# Patient Record
Sex: FEMALE | Race: WHITE | NOT HISPANIC OR LATINO
[De-identification: names, ages, dates, MRNs, and addresses within clinical notes are randomized per-mention and may not be internally consistent; named-entity substitution may affect disease eponyms.]

---

## 2021-01-17 ENCOUNTER — TRANSCRIPTION ENCOUNTER (OUTPATIENT)
Age: 81
End: 2021-01-17

## 2022-04-25 ENCOUNTER — APPOINTMENT (OUTPATIENT)
Dept: ORTHOPEDIC SURGERY | Facility: CLINIC | Age: 82
End: 2022-04-25
Payer: MEDICARE

## 2022-04-25 VITALS — BODY MASS INDEX: 20.73 KG/M2 | WEIGHT: 140 LBS | HEIGHT: 69 IN

## 2022-04-25 PROCEDURE — 99204 OFFICE O/P NEW MOD 45 MIN: CPT

## 2022-04-25 PROCEDURE — 73502 X-RAY EXAM HIP UNI 2-3 VIEWS: CPT | Mod: RT

## 2022-04-25 PROCEDURE — 72100 X-RAY EXAM L-S SPINE 2/3 VWS: CPT

## 2022-04-25 RX ORDER — MELOXICAM 15 MG/1
15 TABLET ORAL
Qty: 30 | Refills: 0 | Status: ACTIVE | COMMUNITY
Start: 2022-04-25 | End: 1900-01-01

## 2022-05-23 ENCOUNTER — APPOINTMENT (OUTPATIENT)
Dept: ORTHOPEDIC SURGERY | Facility: CLINIC | Age: 82
End: 2022-05-23
Payer: MEDICARE

## 2022-05-23 PROCEDURE — 99214 OFFICE O/P EST MOD 30 MIN: CPT

## 2022-05-23 NOTE — DISCUSSION/SUMMARY
[de-identified] : I went over the pathophysiology of the patient's symptoms in great detail with the patient. I am recommending the patient continues going to physical therapy to obtain increases in her strength and mobility. A prescription was provided. She should also keep using the cane. An insurance form was filled out. I would like to see the patient back in the office in 4-6 weeks to reassess her condition. All of her questions were answered. She understands and consents to the plan.\par \par FU in 4-6 weeks.\par after continuing PT for her right hip and low back.

## 2022-05-23 NOTE — HISTORY OF PRESENT ILLNESS
[de-identified] : 81 year old female presents for follow-up evaluation of right hip and low back pain. It worsened 3 months ago. Her pain is primarily on the right buttock with radiation to the lateral hip and thigh. She denies groin pain. She denies radiation of pain down the leg, denies numbness and tingling in the toes or feet. She is going to PT twice a week and is doing better. She is also using a cane in the left hand, which is a big help. She no longer has glute pain. Her leg is still weak. She thinks that she needs more PT. She decided to cancel her trip to the [place] to avoid the excessive walking and stair climbing. We prescribed her Mobic but she decided not to take it.\par Pmhx: Notes a Sulfa allergy.\par \par Radiology Results taken on 04/25/2022:\par o Lumbosacral Spine : AP and lateral views were obtained and reveal diffuse diffuse degenerative disc disease worse at L2/L3 and L3/L4 with anterior osteophytes and foraminal stenosis worse at L2/L3 and L3/L4. Diffuse facet arthropathy. \par o Pelvis and Right Hip : AP pelvis and lateral RIGHT hip were obtained and reveal moderate arthritis of the right hip. Mild arthritis left hip. Right sacroiliac joint arthritis.

## 2022-05-23 NOTE — ADDENDUM
[FreeTextEntry1] : I, Jelani Connor, acted solely as a scribe for Dr. Jaguar Adams on this date 05/23/2022.\par All medical record entries made by the Scribe were at my, Dr. Jaguar Adams, direction and personally dictated by me on 05/23/2022. I have reviewed the chart and agree that the record accurately reflects my personal performance of the history, physical exam, assessment and plan. I have also personally directed, reviewed, and agreed with the chart.

## 2022-05-23 NOTE — PHYSICAL EXAM
[de-identified] : Constitutional\par o Appearance : well-nourished, well developed, alert, in no acute distress \par Head and Face\par o Head :\par ¦ Inspection : atraumatic, normocephalic\par o Face :\par ¦ Inspection : no visible rash or discoloration\par Respiratory\par o Respiratory Effort: breathing unlabored \par Neurologic\par o Mental Status Examination :\par ¦ Orientation : alert and oriented X 3\par Psychiatric\par o Mood and Affect: mood normal, affect appropriate\par Cardiovascular\par o Observation/Palpation : - no swelling\par Lymphatic\par o Additional Nodes : No palpable lymph nodes present\par \par Lumbosacral Spine\par o Inspection : no visible rash or discoloration\par o Palpation : no paraspinal musculature tenderness\par o Range of Motion : sidebending to the left causes right buttock pain, extension and rotation do not cause discomfort\par o Muscle Strength : paraspinal muscle strength and tone within normal limits\par o Muscle Tone : paraspinal muscle strength and tone within normal limits\par o Tests: straight leg test negative and BETHANY test negative bilaterally\par  \par Right Lower Extremity\par o Buttock : sciatic notch tenderness, no swelling or deformities\par o Right Hip :\par ¦ Inspection/Palpation : tenderness over the greater trochanter and abductors, no swelling\par ¦ Range of Motion : FROM, discomfort in the mid arc with external rotation and crepitance, no pain with gentle rotation \par ¦ Stability : joint stability intact\par ¦ Strength : extension, flexion 4-/5, adduction, abduction, internal rotation and external rotation\par ¦ Tests: Claudia’s test negative\par \par Left Lower Extremity\par o Buttock : no tenderness, swelling or deformities \par o Left Hip :\par ¦ Inspection/Palpation : no tenderness, no swelling or deformities\par ¦ Range of Motion : full flexion and painless rotation, no crepitance\par ¦ Stability : joint stability intact\par ¦ Strength : extension, flexion, adduction, abduction, internal rotation and external rotation, 5/5\par ¦ Tests: Claudia’s test negative\par \par Gait: abductor lurch to the right, very short stride and antalgic gait on the right, no significant extremity swelling or lymphedema, good proprioception and balance, no foot drop, normal sensation to light touch, limited core strength but improved

## 2022-07-06 ENCOUNTER — APPOINTMENT (OUTPATIENT)
Dept: ORTHOPEDIC SURGERY | Facility: CLINIC | Age: 82
End: 2022-07-06

## 2022-07-06 PROCEDURE — 99214 OFFICE O/P EST MOD 30 MIN: CPT

## 2022-07-06 RX ORDER — NITROFURANTOIN (MONOHYDRATE/MACROCRYSTALS) 25; 75 MG/1; MG/1
100 CAPSULE ORAL
Qty: 14 | Refills: 0 | Status: ACTIVE | COMMUNITY
Start: 2022-03-01

## 2022-07-06 RX ORDER — PHENAZOPYRIDINE HYDROCHLORIDE 200 MG/1
200 TABLET ORAL
Qty: 6 | Refills: 0 | Status: ACTIVE | COMMUNITY
Start: 2022-03-01

## 2022-07-06 RX ORDER — MUPIROCIN 20 MG/G
2 OINTMENT TOPICAL
Qty: 22 | Refills: 0 | Status: ACTIVE | COMMUNITY
Start: 2022-05-26

## 2022-07-06 RX ORDER — CEFUROXIME AXETIL 500 MG/1
500 TABLET ORAL
Qty: 14 | Refills: 0 | Status: ACTIVE | COMMUNITY
Start: 2022-03-04

## 2022-07-06 NOTE — PHYSICAL EXAM
[de-identified] : Constitutional\par o Appearance : well-nourished, well developed, alert, in no acute distress \par Head and Face\par o Head :\par ¦ Inspection : atraumatic, normocephalic\par o Face :\par ¦ Inspection : no visible rash or discoloration\par Respiratory\par o Respiratory Effort: breathing unlabored \par Neurologic\par o Mental Status Examination :\par ¦ Orientation : alert and oriented X 3\par Psychiatric\par o Mood and Affect: mood normal, affect appropriate\par Cardiovascular\par o Observation/Palpation : - no swelling\par Lymphatic\par o Additional Nodes : No palpable lymph nodes present\par \par Lumbosacral Spine\par o Inspection : no visible rash or discoloration\par o Palpation : no paraspinal musculature tenderness, no SI joint tenderness, mild right sciatic notch tenderness \par o Range of Motion : extension causes no discomfort, sidebending to the right causes mild right paralumbar discomfort, rotation does not cause discomfort \par o Muscle Strength : paraspinal muscle strength and tone within normal limits\par o Muscle Tone : paraspinal muscle strength and tone within normal limits\par o Tests: straight leg test negative and BETHANY test negative bilaterally\par  \par Right Lower Extremity\par o Buttock : sciatic notch tenderness, no swelling or deformities\par o Right Hip :\par ¦ Inspection/Palpation : mild greater trochanteric tenderness, no swelling\par ¦ Range of Motion : full flexion and internal rotation, slight limitation of external rotation, no crepitance \par ¦ Stability : joint stability intact\par ¦ Strength : extension, flexion 4/5, adduction, abduction 4-/5, abduction and extension 4-/5, internal rotation and external rotation\par ¦ Tests: Claudia’s test negative\par \par Left Lower Extremity\par o Buttock : no tenderness, swelling or deformities \par o Left Hip :\par ¦ Inspection/Palpation : no greater trochanteric tenderness, no swelling or deformities\par ¦ Range of Motion : full flexion and painless rotation, no crepitance\par ¦ Stability : joint stability intact\par ¦ Strength : extension, flexion 5/5, adduction, abduction, internal rotation and external rotation\par ¦ Tests: Claudia’s test negative\par \par Gait: minimal abductor lurch to the right, no significant extremity swelling or lymphedema, good proprioception and balance, no foot drop, equal leg lengths,  normal sensation to light touch, limited core strength but improved

## 2022-07-06 NOTE — DISCUSSION/SUMMARY
[de-identified] : I went over the pathophysiology of the patient's symptoms in great detail with the patient. At-home strengthening exercises were discussed and demonstrated in great detail with the patient, with an exercise sheet being provided. She should purchase adjustable Velcro ankle weights that range from 0-5 pounds and begin a diligent at-home exercise program. She should focus on light weight and high repetition exercises. I am recommending the patient continues going to physical therapy to obtain increases in her strength and mobility. A prescription was provided. I recommend that she brings a cane or foldable walking stick with her on her trip. All of her questions were answered. She understands and consents to the plan. \par \par FU in 4-6 weeks.\par after continuing PT for her right hip.\par after following a diligent HEP with weights.

## 2022-07-06 NOTE — ADDENDUM
[FreeTextEntry1] : I, Jelani Connor, acted solely as a scribe for Dr. Jaguar Adams on this date 07/06/2022.\par All medical record entries made by the Scribe were at my, Dr. Jaguar Adams, direction and personally dictated by me on 07/06/2022. I have reviewed the chart and agree that the record accurately reflects my personal performance of the history, physical exam, assessment and plan. I have also personally directed, reviewed, and agreed with the chart.

## 2022-07-06 NOTE — HISTORY OF PRESENT ILLNESS
[de-identified] : 81 year old female presents with right hip pain. She is still going to PT 2x/week and is feeling much better. She is not completely better. She notes discomfort on the lateral hip and thigh. It does not pass the knee. She no longer has buttock pain. She is weaning herself off of the cane, and left it in the car today. She denies radiation of pain down the leg, or numbness and tingling in the toes or feet. She has weights at home but does not do them.\par Pmhx: Notes a Sulfa allergy. We prescribed her Mobic but she decided not to take it.\par \par Radiology Results 04/25/2022:\par o Lumbosacral Spine : AP and lateral views were obtained and reveal diffuse diffuse degenerative disc disease worse at L2/L3 and L3/L4 with anterior osteophytes and foraminal stenosis worse at L2/L3 and L3/L4. Diffuse facet arthropathy. \par o Pelvis and Right Hip : AP pelvis and lateral RIGHT hip were obtained and reveal moderate arthritis of the right hip. Mild arthritis left hip. Right sacroiliac joint arthritis.

## 2022-08-18 ENCOUNTER — APPOINTMENT (OUTPATIENT)
Dept: ORTHOPEDIC SURGERY | Facility: CLINIC | Age: 82
End: 2022-08-18

## 2022-08-18 DIAGNOSIS — M47.816 SPONDYLOSIS W/OUT MYELOPATHY OR RADICULOPATHY, LUMBAR REGION: ICD-10-CM

## 2022-08-18 PROCEDURE — 99213 OFFICE O/P EST LOW 20 MIN: CPT

## 2022-08-18 NOTE — ADDENDUM
[FreeTextEntry1] : I, Jelani Connor, acted solely as a scribe for Dr. Jaguar Adams on this date 08/18/2022.\par All medical record entries made by the Scribe were at my, Dr. Jaguar Adams, direction and personally dictated by me on 08/18/2022. I have reviewed the chart and agree that the record accurately reflects my personal performance of the history, physical exam, assessment and plan. I have also personally directed, reviewed, and agreed with the chart.

## 2022-08-18 NOTE — DISCUSSION/SUMMARY
[de-identified] : I went over the pathophysiology of the patient's symptoms in great detail with the patient. I am recommending the patient continues going to physical therapy to work on balance, core strength, and increased hip strength. A prescription was provided. I would like to see the patient back in the office in 6-8 weeks to reassess her condition. All of their questions were answered. They understand and consent to the plan.\par \par FU on 6-8 weeks.\par after continuing PT for balance, core strength, and increased hip strength.

## 2022-08-18 NOTE — HISTORY OF PRESENT ILLNESS
Patient's call transferred to author    Patient requesting Covid-19 test results from 1/25/2022   Chart reviewed   Relayed positive Covid-19 test result     Reviewed CDC guidelines for quarantine and when to come out of quarantine   Patient first day of symptoms was 1/22/2022    Patient verbalized understanding  No further questions/concerns    Rigoberto Falk RN     [de-identified] : 81 year old female presents with right hip pain. She is still going to PT 2x/week and is seeing progress. She is also working with 3 lb ankle weights every other day. She reports her hip is much better, but her right side is not completely better. She is feeling more confident.\par Pmhx: Notes a Sulfa allergy. We prescribed Mobic but she decided not to take it.\par \par Radiology Results 04/25/2022:\par o Lumbosacral Spine : AP and lateral views were obtained and reveal diffuse diffuse degenerative disc disease worse at L2/L3 and L3/L4 with anterior osteophytes and foraminal stenosis worse at L2/L3 and L3/L4. Diffuse facet arthropathy. \par o Pelvis and Right Hip : AP pelvis and lateral RIGHT hip were obtained and reveal moderate arthritis of the right hip. Mild arthritis left hip. Right sacroiliac joint arthritis.

## 2022-10-06 ENCOUNTER — APPOINTMENT (OUTPATIENT)
Dept: ORTHOPEDIC SURGERY | Facility: CLINIC | Age: 82
End: 2022-10-06

## 2022-10-06 DIAGNOSIS — M16.11 UNILATERAL PRIMARY OSTEOARTHRITIS, RIGHT HIP: ICD-10-CM

## 2022-10-06 DIAGNOSIS — M48.061 SPINAL STENOSIS, LUMBAR REGION WITHOUT NEUROGENIC CLAUDICATION: ICD-10-CM

## 2022-10-06 DIAGNOSIS — M16.52 UNILATERAL POST-TRAUMATIC OSTEOARTHRITIS, LEFT HIP: ICD-10-CM

## 2022-10-06 PROCEDURE — 99214 OFFICE O/P EST MOD 30 MIN: CPT

## 2022-10-06 NOTE — DISCUSSION/SUMMARY
[de-identified] : I went over the pathophysiology of the patient's symptoms in great detail with the patient. I am recommending the patient continues the home exercises she learned in physical therapy to work on balance, core strength, and increased hip strength.  All of her questions were answered. She understands and consents to the plan.\par \par FU PRN

## 2022-10-06 NOTE — PHYSICAL EXAM
[de-identified] : Constitutional\par o Appearance : well-nourished, well developed, alert, in no acute distress \par Head and Face\par o Head :\par ¦ Inspection : atraumatic, normocephalic\par o Face :\par ¦ Inspection : no visible rash or discoloration\par Respiratory\par o Respiratory Effort: breathing unlabored \par Neurologic\par o Mental Status Examination :\par ¦ Orientation : alert and oriented X 3\par Psychiatric\par o Mood and Affect: mood normal, affect appropriate\par Cardiovascular\par o Observation/Palpation : - no swelling\par Lymphatic\par o Additional Nodes : No palpable lymph nodes present\par \par Lumbosacral Spine\par o Inspection : no visible rash or discoloration\par o Palpation : no paraspinal musculature tenderness, no SI joint tenderness, no right sciatic notch tenderness \par o Range of Motion : extension and rotation cause no discomfort\par o Muscle Strength : paraspinal muscle strength and tone within normal limits\par o Muscle Tone : paraspinal muscle strength and tone within normal limits\par o Tests: straight leg test negative and BETHANY test negative bilaterally\par  \par Right Lower Extremity\par o Buttock : sciatic notch tenderness, no swelling or deformities\par o Right Hip :\par ¦ Inspection/Palpation : no greater trochanteric or ITB tenderness, no swelling\par ¦ Range of Motion : full flexion and rotation, no crepitance \par ¦ Stability : joint stability intact\par ¦ Strength : extension, flexion 5/5, adduction, abduction 5/5, internal rotation and external rotation\par ¦ Tests: Claudia’s test negative\par \par Left Lower Extremity\par o Buttock : no tenderness, swelling or deformities \par o Left Hip :\par ¦ Inspection/Palpation : no greater trochanteric or ITB tenderness, no swelling or deformities\par ¦ Range of Motion : full flexion and painless rotation, no crepitance\par ¦ Stability : joint stability intact\par ¦ Strength : extension, flexion 5/5, adduction, abduction 5/5, internal rotation and external rotation\par ¦ Tests: Claudia’s test negative\par \par Gait: no abductor  lurch on  the right, no significant extremity swelling or lymphedema, good proprioception and balance, no foot drop, equal leg lengths, normal sensation to light touch, limited core strength

## 2023-05-16 NOTE — PHYSICAL EXAM
[de-identified] : Constitutional\par o Appearance : well-nourished, well developed, alert, in no acute distress \par Head and Face\par o Head :\par ¦ Inspection : atraumatic, normocephalic\par o Face :\par ¦ Inspection : no visible rash or discoloration\par Respiratory\par o Respiratory Effort: breathing unlabored \par Neurologic\par o Mental Status Examination :\par ¦ Orientation : alert and oriented X 3\par Psychiatric\par o Mood and Affect: mood normal, affect appropriate\par Cardiovascular\par o Observation/Palpation : - no swelling\par Lymphatic\par o Additional Nodes : No palpable lymph nodes present\par \par Lumbosacral Spine\par o Inspection : no visible rash or discoloration\par o Palpation : no paraspinal musculature tenderness, no SI joint tenderness, mild right sciatic notch tenderness \par o Range of Motion : extension causes no discomfort, sidebending to the right causes mild right paralumbar discomfort, rotation does not cause discomfort \par o Muscle Strength : paraspinal muscle strength and tone within normal limits\par o Muscle Tone : paraspinal muscle strength and tone within normal limits\par o Tests: straight leg test negative and BETHANY test negative bilaterally\par  \par Right Lower Extremity\par o Buttock : sciatic notch tenderness, no swelling or deformities\par o Right Hip :\par ¦ Inspection/Palpation : no greater trochanteric or ITB tenderness, no swelling\par ¦ Range of Motion : full flexion and rotation, no crepitance \par ¦ Stability : joint stability intact\par ¦ Strength : extension, flexion 4+/5, adduction, abduction 4/5, internal rotation and external rotation\par ¦ Tests: Claudia’s test negative\par \par Left Lower Extremity\par o Buttock : no tenderness, swelling or deformities \par o Left Hip :\par ¦ Inspection/Palpation : no greater trochanteric or ITB tenderness, no swelling or deformities\par ¦ Range of Motion : full flexion and painless rotation, no crepitance\par ¦ Stability : joint stability intact\par ¦ Strength : extension, flexion 5/5, adduction, abduction 5/5, internal rotation and external rotation\par ¦ Tests: Claudia’s test negative\par \par Gait: no abductor  lurch on  the right, no significant extremity swelling or lymphedema, good proprioception and balance, no foot drop, equal leg lengths, normal sensation to light touch, limited core strength
patient